# Patient Record
Sex: FEMALE | Race: WHITE | Employment: FULL TIME | URBAN - METROPOLITAN AREA
[De-identification: names, ages, dates, MRNs, and addresses within clinical notes are randomized per-mention and may not be internally consistent; named-entity substitution may affect disease eponyms.]

---

## 2017-01-22 ENCOUNTER — HOSPITAL ENCOUNTER (OUTPATIENT)
Age: 27
Discharge: HOME | End: 2017-01-22
Attending: FAMILY MEDICINE
Payer: COMMERCIAL

## 2017-01-22 VITALS
WEIGHT: 130 LBS | SYSTOLIC BLOOD PRESSURE: 104 MMHG | OXYGEN SATURATION: 100 % | HEIGHT: 67.5 IN | HEART RATE: 82 BPM | TEMPERATURE: 98 F | RESPIRATION RATE: 20 BRPM | DIASTOLIC BLOOD PRESSURE: 72 MMHG | BODY MASS INDEX: 20.17 KG/M2

## 2017-01-22 DIAGNOSIS — R09.81 NASAL CONGESTION: Primary | ICD-10-CM

## 2017-01-22 DIAGNOSIS — R09.82 POST-NASAL DRIP: ICD-10-CM

## 2017-01-22 DIAGNOSIS — J01.00 ACUTE MAXILLARY SINUSITIS, RECURRENCE NOT SPECIFIED: ICD-10-CM

## 2017-01-22 PROCEDURE — 99203 OFFICE O/P NEW LOW 30 MIN: CPT

## 2017-01-22 PROCEDURE — 99204 OFFICE O/P NEW MOD 45 MIN: CPT

## 2017-01-22 RX ORDER — FLUTICASONE PROPIONATE 50 MCG
2 SPRAY, SUSPENSION (ML) NASAL DAILY
Qty: 16 G | Refills: 0 | Status: SHIPPED | OUTPATIENT
Start: 2017-01-22 | End: 2017-02-21

## 2017-01-22 RX ORDER — AMOXICILLIN AND CLAVULANATE POTASSIUM 875; 125 MG/1; MG/1
1 TABLET, FILM COATED ORAL 2 TIMES DAILY
Qty: 20 TABLET | Refills: 0 | Status: SHIPPED | OUTPATIENT
Start: 2017-01-22 | End: 2017-02-01

## 2017-01-22 NOTE — ED INITIAL ASSESSMENT (HPI)
Pt c/o URI symptoms on/off for 2 months. Two days ago states woke up during the night with fever-\"felt feverish, drenched in sweat\". C/o nasal congestion, post nasal drip and cough. No chest pain or shortness of breath.

## 2017-01-22 NOTE — ED PROVIDER NOTES
Patient Seen in: THE [facility] [place] [facility]    History   Patient presents with:  Cough/URI    Stated Complaint: SINUS INFECTION     HPI  16-year-old female coming in with complaints of nasal congestion, postnasal drip she has had on and off for in HPI. Constitutional and vital signs reviewed. All other systems reviewed and negative except as noted above. PSFH elements reviewed from today and agreed except as otherwise stated in HPI.     Physical Exam       ED Triage Vitals   BP 01/22/17 0 fragrance free vaseline to apply over the nasal septal mucosa)  · Daily antihistamine - Claritin OR Zyrtec in the AM (non-drowsy) and take benadryl 25 mg at night time  · OTC Afrin nasal decongestant spray and Sudafed 12 hr - helps with congestion.     · Pn

## 2018-03-21 ENCOUNTER — OFFICE VISIT (OUTPATIENT)
Dept: FAMILY MEDICINE CLINIC | Facility: CLINIC | Age: 28
End: 2018-03-21

## 2018-03-21 VITALS
SYSTOLIC BLOOD PRESSURE: 108 MMHG | WEIGHT: 131 LBS | RESPIRATION RATE: 14 BRPM | HEART RATE: 64 BPM | OXYGEN SATURATION: 99 % | BODY MASS INDEX: 20.56 KG/M2 | DIASTOLIC BLOOD PRESSURE: 68 MMHG | HEIGHT: 67 IN | TEMPERATURE: 99 F

## 2018-03-21 DIAGNOSIS — J22 ACUTE LOWER RESPIRATORY INFECTION: Primary | ICD-10-CM

## 2018-03-21 PROCEDURE — 99203 OFFICE O/P NEW LOW 30 MIN: CPT | Performed by: FAMILY MEDICINE

## 2018-03-21 NOTE — PROGRESS NOTES
HPI:   Ted Jha is a 28year old female who presents for upper respiratory symptoms for  3  days.  Patient reports congestion, dry cough, OTC cold meds have not been helping, took zpak and is no longer having sinus congestion but cough is persistent w and throat are clear; no maxillary and frontal sinus tenderness  NECK: supple,no adenopathy  LUNGS: clear to auscultation, no r/r/w  CARDIO: RRR without murmur  GI: good BS's,no masses, HSM or tenderness    ASSESSMENT AND PLAN:   Dell Daley is a 28 yea

## 2018-09-13 ENCOUNTER — OFFICE VISIT (OUTPATIENT)
Dept: FAMILY MEDICINE CLINIC | Facility: CLINIC | Age: 28
End: 2018-09-13
Payer: COMMERCIAL

## 2018-09-13 ENCOUNTER — HOSPITAL ENCOUNTER (OUTPATIENT)
Dept: GENERAL RADIOLOGY | Age: 28
Discharge: HOME | End: 2018-09-13
Attending: FAMILY MEDICINE
Payer: COMMERCIAL

## 2018-09-13 ENCOUNTER — HOSPITAL ENCOUNTER (OUTPATIENT)
Age: 28
Discharge: HOME | End: 2018-09-13
Attending: FAMILY MEDICINE
Payer: COMMERCIAL

## 2018-09-13 VITALS
DIASTOLIC BLOOD PRESSURE: 81 MMHG | TEMPERATURE: 99 F | WEIGHT: 133 LBS | HEART RATE: 57 BPM | SYSTOLIC BLOOD PRESSURE: 109 MMHG | RESPIRATION RATE: 18 BRPM | HEIGHT: 67 IN | BODY MASS INDEX: 20.88 KG/M2 | OXYGEN SATURATION: 97 %

## 2018-09-13 VITALS
SYSTOLIC BLOOD PRESSURE: 110 MMHG | WEIGHT: 133 LBS | BODY MASS INDEX: 20.88 KG/M2 | RESPIRATION RATE: 14 BRPM | TEMPERATURE: 98 F | HEART RATE: 64 BPM | HEIGHT: 67 IN | DIASTOLIC BLOOD PRESSURE: 68 MMHG

## 2018-09-13 DIAGNOSIS — M25.532 ACUTE PAIN OF LEFT WRIST: ICD-10-CM

## 2018-09-13 DIAGNOSIS — S67.22XA CRUSHING INJURY OF LEFT HAND AND FINGER, INITIAL ENCOUNTER: Primary | ICD-10-CM

## 2018-09-13 DIAGNOSIS — M79.642 LEFT HAND PAIN: Primary | ICD-10-CM

## 2018-09-13 DIAGNOSIS — M79.642 LEFT HAND PAIN: ICD-10-CM

## 2018-09-13 PROCEDURE — 99213 OFFICE O/P EST LOW 20 MIN: CPT

## 2018-09-13 PROCEDURE — 29125 APPL SHORT ARM SPLINT STATIC: CPT

## 2018-09-13 PROCEDURE — 73110 X-RAY EXAM OF WRIST: CPT | Performed by: FAMILY MEDICINE

## 2018-09-13 PROCEDURE — 99214 OFFICE O/P EST MOD 30 MIN: CPT | Performed by: FAMILY MEDICINE

## 2018-09-13 PROCEDURE — 73130 X-RAY EXAM OF HAND: CPT | Performed by: FAMILY MEDICINE

## 2018-09-13 RX ORDER — IBUPROFEN 600 MG/1
600 TABLET ORAL EVERY 8 HOURS PRN
Qty: 30 TABLET | Refills: 0 | Status: SHIPPED | OUTPATIENT
Start: 2018-09-13 | End: 2018-09-20

## 2018-09-13 RX ORDER — ETODOLAC 400 MG/1
400 TABLET, FILM COATED ORAL 2 TIMES DAILY
Qty: 28 TABLET | Refills: 0 | Status: SHIPPED | OUTPATIENT
Start: 2018-09-13 | End: 2018-09-13

## 2018-09-13 NOTE — PROGRESS NOTES
[facility] Office Note  Chief Complaint:   Patient presents with:  Pain: left hand injury yesterday      HPI:   This is a 28year old female coming in for acute left hand and wrist pain that started yesterday.   Patient states she tablet Rfl: 0      Counseling given: Not Answered       REVIEW OF SYSTEMS:   ROS:  CONSTITUTIONAL:  Denies any unusual weight gain/loss, fever, chills, weakness or fatigue. CARDIOVASCULAR:  Denies chest pain, chest pressure or chest discomfort.  No palpita total) by mouth 2 (two) times daily for 14 days. Dispense: 28 tablet; Refill: 0    2.  Acute pain of left wrist  -  Check xray to r/o dislocation or fracture  -  Start etodolac 400mg BID x 2 weeks  -  Ice 20 min 2-3x per day  -  Will refer to Dr. Richard Marie fr

## 2018-09-13 NOTE — ED INITIAL ASSESSMENT (HPI)
The patient states she was walking her dog last night with the leash wrapped in her hand and her dog pulled the leash to run at another dog, causing severe pain and injury to the left hand.   She took ibuprofen 400mg this morning around 7am and intermittent

## 2021-01-20 ENCOUNTER — TELEPHONE (OUTPATIENT)
Dept: OBGYN CLINIC | Facility: CLINIC | Age: 31
End: 2021-01-20

## 2021-01-20 NOTE — TELEPHONE ENCOUNTER
Patient calling to transfer OB care  [place] 21w 1 d   [facility] 6/1/21  Insurance [employer]  Good time to return phone call anytime    Transfer from [facility] will be in first week of March or so    Pt would want to talk with a nurse first to get a full idea of the pract

## 2021-01-21 NOTE — TELEPHONE ENCOUNTER
Called patient back to discuss our practice, rotating through providers during pregnancy, etc.  Pt had no further questions. Will call back if she desires appt.

## 2022-08-17 ENCOUNTER — EMPLOYEE HEALTH (OUTPATIENT)
Dept: OTHER | Facility: HOSPITAL | Age: 32
End: 2022-08-17
Attending: PREVENTIVE MEDICINE

## 2022-08-17 DIAGNOSIS — Z01.84 IMMUNITY STATUS TESTING: Primary | ICD-10-CM

## 2022-08-17 DIAGNOSIS — Z11.1 SCREENING-PULMONARY TB: ICD-10-CM

## 2022-08-17 LAB — VZV IGG SER IA-ACNC: 996.5 (ref 165–?)

## 2022-08-17 PROCEDURE — 86787 VARICELLA-ZOSTER ANTIBODY: CPT

## 2022-08-17 PROCEDURE — 86480 TB TEST CELL IMMUN MEASURE: CPT

## 2022-08-19 LAB
M TB IFN-G CD4+ T-CELLS BLD-ACNC: 0 IU/ML
M TB TUBERC IFN-G BLD QL: NEGATIVE
M TB TUBERC IGNF/MITOGEN IGNF CONTROL: >10 IU/ML
QFT TB1 AG MINUS NIL: 0.03 IU/ML
QFT TB2 AG MINUS NIL: 0.02 IU/ML

## 2022-10-22 ENCOUNTER — HOSPITAL ENCOUNTER (OUTPATIENT)
Age: 32
Discharge: HOME | End: 2022-10-22
Payer: COMMERCIAL

## 2022-10-22 VITALS
HEIGHT: 67 IN | SYSTOLIC BLOOD PRESSURE: 102 MMHG | HEART RATE: 88 BPM | BODY MASS INDEX: 21.19 KG/M2 | WEIGHT: 135 LBS | OXYGEN SATURATION: 99 % | RESPIRATION RATE: 18 BRPM | TEMPERATURE: 99 F | DIASTOLIC BLOOD PRESSURE: 78 MMHG

## 2022-10-22 DIAGNOSIS — J01.10 ACUTE NON-RECURRENT FRONTAL SINUSITIS: Primary | ICD-10-CM

## 2022-10-22 PROCEDURE — 99203 OFFICE O/P NEW LOW 30 MIN: CPT | Performed by: NURSE PRACTITIONER

## 2022-10-22 RX ORDER — AMOXICILLIN 875 MG/1
875 TABLET, COATED ORAL 2 TIMES DAILY
Qty: 20 TABLET | Refills: 0 | Status: SHIPPED | OUTPATIENT
Start: 2022-10-22 | End: 2022-11-01

## 2024-07-19 ENCOUNTER — HOSPITAL ENCOUNTER (OUTPATIENT)
Age: 34
Discharge: HOME | End: 2024-07-19
Payer: COMMERCIAL

## 2024-07-19 VITALS
TEMPERATURE: 99 F | SYSTOLIC BLOOD PRESSURE: 103 MMHG | HEART RATE: 68 BPM | OXYGEN SATURATION: 100 % | WEIGHT: 135 LBS | HEIGHT: 67 IN | DIASTOLIC BLOOD PRESSURE: 56 MMHG | BODY MASS INDEX: 21.19 KG/M2 | RESPIRATION RATE: 20 BRPM

## 2024-07-19 DIAGNOSIS — L03.811 CELLULITIS OF HEAD EXCEPT FACE: ICD-10-CM

## 2024-07-19 DIAGNOSIS — S00.451A EMBEDDED EARRING OF RIGHT EAR, INITIAL ENCOUNTER: Primary | ICD-10-CM

## 2024-07-19 RX ORDER — CEFADROXIL 500 MG/1
500 CAPSULE ORAL 2 TIMES DAILY
Qty: 14 CAPSULE | Refills: 0 | Status: SHIPPED | OUTPATIENT
Start: 2024-07-19 | End: 2024-07-26

## 2024-07-19 NOTE — ED PROVIDER NOTES
Patient Seen in: [facility]      History     Chief Complaint   Patient presents with    Ear Piercing     Stated Complaint: infected ear piercing    Subjective:   Patient presents for right ear pain and embedded earring. She states she noted redness 4 days ago, swelling and drainage worsening         Objective:   No pertinent past medical history.            No pertinent past surgical history.              No pertinent social history.            Review of Systems   Constitutional: Negative.    Respiratory: Negative.     Cardiovascular: Negative.    Gastrointestinal: Negative.    Skin: Negative.    Neurological: Negative.        Positive for stated Chief Complaint: Ear Piercing    Other systems are as noted in HPI.  Constitutional and vital signs reviewed.      All other systems reviewed and negative except as noted above.    Physical Exam     ED Triage Vitals [07/19/24 0815]   /56   Pulse 68   Resp 20   Temp 98.5 °F (36.9 °C)   Temp src Temporal   SpO2 100 %   O2 Device None (Room air)       Current Vitals:   Vital Signs  BP: 103/56  Pulse: 68  Resp: 20  Temp: 98.5 °F (36.9 °C)  Temp src: Temporal    Oxygen Therapy  SpO2: 100 %  O2 Device: None (Room air)            Physical Exam  Vitals and nursing note reviewed.   Constitutional:       General: She is not in acute distress.  HENT:      Head: Normocephalic.      Ears:     Cardiovascular:      Rate and Rhythm: Normal rate.   Pulmonary:      Effort: Pulmonary effort is normal.   Musculoskeletal:         General: Normal range of motion.   Skin:     General: Skin is warm and dry.   Neurological:      General: No focal deficit present.      Mental Status: She is alert and oriented to person, place, and time.               ED Course   Labs Reviewed - No data to display  Topical let applies to [place] for 20 minutes  Attempt #1 to remove earring without success, pt intolerant of pain level.  Auricular Block performed with Bupivacaine  Earring  removed, bleeding and purulent drainage noted.                  [place]      Medical Decision Making  Pertinent Labs & Imaging studies reviewed. (See chart for details).  Patient coming in with right ear foreign body  Differential diagnosis includes embedded earring  Will treat for embedded earring, cellulitis  Will discharge duricef. Patient is comfortable with this plan.      Overall Pt looks good. Non-toxic, well-hydrated and in no respiratory distress. Vital signs are reassuring. Exam is reassuring. I do not believe pt requires and additional diagnostic studies or intervention. I believe pt can be discharged home to continue evaluation as an outpatient. Follow-up provider given. Discharge instructions given and reviewed. Return for any problems. All understand and agree with the plan.      Problems Addressed:  Cellulitis of head except face: acute illness or injury  Embedded earring of right ear, initial encounter: acute illness or injury    Risk  OTC drugs.  Prescription drug management.        Disposition and Plan     Clinical Impression:  1. Embedded earring of right ear, initial encounter    2. Cellulitis of head except face         Disposition:  Discharge  7/19/2024  9:56 am    Follow-up:  Manuelito Robertson DO  [address] [place] [id number]  [phone]                Medications Prescribed:  Discharge Medication List as of 7/19/2024  9:58 AM        START taking these medications    Details   cefadroxil 500 MG Oral Cap Take 1 capsule (500 mg total) by mouth 2 (two) times daily for 7 days., Normal, Disp-14 capsule, R-0

## 2024-07-19 NOTE — ED INITIAL ASSESSMENT (HPI)
Patient with right cartilage piercing. States it was pierced 7 months ago   4 days ago it became red and swollen    She has been using sterile saline without relief     Patient states that she has worsening pain, pus from the area  No fevers     Unable to get the earring out

## 2024-08-16 ENCOUNTER — HOSPITAL ENCOUNTER (OUTPATIENT)
Dept: ULTRASOUND IMAGING | Age: 34
End: 2024-08-16
Attending: PHYSICIAN ASSISTANT

## 2024-08-16 DIAGNOSIS — M79.89 SOFT TISSUE MASS: ICD-10-CM

## 2024-08-16 PROCEDURE — 76536 US EXAM OF HEAD AND NECK: CPT

## 2024-08-20 ENCOUNTER — HOSPITAL ENCOUNTER (OUTPATIENT)
Dept: ULTRASOUND IMAGING | Age: 34
Discharge: HOME | End: 2024-08-20
Attending: PHYSICIAN ASSISTANT

## 2024-08-20 DIAGNOSIS — M79.89 SOFT TISSUE MASS: ICD-10-CM

## 2024-08-20 PROCEDURE — 76705 ECHO EXAM OF ABDOMEN: CPT

## 2025-05-28 ENCOUNTER — TELEPHONE (OUTPATIENT)
Dept: OTHER | Age: 35
End: 2025-05-28

## 2025-05-28 DIAGNOSIS — Z80.3 FAMILY HISTORY OF BREAST CANCER: Primary | ICD-10-CM

## 2025-07-09 DIAGNOSIS — R22.1 NECK MASS: Primary | ICD-10-CM

## 2025-08-04 ENCOUNTER — APPOINTMENT (OUTPATIENT)
Dept: MRI IMAGING | Age: 35
End: 2025-08-04
Attending: PHYSICIAN ASSISTANT

## 2025-08-06 ENCOUNTER — TELEPHONE (OUTPATIENT)
Dept: SURGERY | Age: 35
End: 2025-08-06

## 2025-08-08 ENCOUNTER — CLINICAL DOCUMENTATION (OUTPATIENT)
Dept: SURGERY | Age: 35
End: 2025-08-08

## 2025-09-09 ENCOUNTER — APPOINTMENT (OUTPATIENT)
Dept: SURGERY | Age: 35
End: 2025-09-09

## 2025-11-12 ENCOUNTER — APPOINTMENT (OUTPATIENT)
Dept: SURGERY | Age: 35
End: 2025-11-12
Attending: PHYSICIAN ASSISTANT

## (undated) NOTE — ED AVS SNAPSHOT
[facility] in [address] [place] [address] Drive,[address]    [address] [place] [address]    Phone:  [phone]    Fax:  [phone]           Ms. Víctor Ballesteros   MRN: [id number]    Department:  THE [facility]   Date of Visit:  1/22/2017 inflammation and reduce post nasal drip (side effects of nasal septal mucosal dryness and nosebleeds discussed - to avoid / reduce the risk of this use fragrance free vaseline to apply over the nasal septal mucosa)  · Daily antihistamine - Claritin OR Zyrt self-assessment the day after your visit. You may also receive a call from our patient liason soon after your visit. Also, some patients receive a detailed feedback survey mailed to them a week after the visit.   If you receive this, we would really apprec [facility] [address] [facility] [id number] [address] ([address]) [facility] [address] [facility]. ([address]) [facility] If you have questions, you can call [phone] to talk to our [facility] Staff. Remember, [employer] is NOT to be used for urgent needs. For medical emergencies, dial 911.